# Patient Record
Sex: MALE | Race: WHITE | NOT HISPANIC OR LATINO | Employment: FULL TIME | URBAN - NONMETROPOLITAN AREA
[De-identification: names, ages, dates, MRNs, and addresses within clinical notes are randomized per-mention and may not be internally consistent; named-entity substitution may affect disease eponyms.]

---
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
## 2020-02-27 ENCOUNTER — APPOINTMENT (OUTPATIENT)
Dept: CHIROPRACTIC MEDICINE | Facility: OTHER | Age: 20
End: 2020-02-27

## 2020-02-27 ENCOUNTER — APPOINTMENT (OUTPATIENT)
Dept: OCCUPATIONAL MEDICINE | Facility: OTHER | Age: 20
End: 2020-02-27

## 2020-02-27 PROCEDURE — 99499 UNLISTED E&M SERVICE: CPT

## 2020-03-10 ENCOUNTER — OFFICE VISIT (OUTPATIENT)
Dept: FAMILY MEDICINE | Facility: OTHER | Age: 20
End: 2020-03-10
Attending: NURSE PRACTITIONER
Payer: COMMERCIAL

## 2020-03-10 ENCOUNTER — TELEPHONE (OUTPATIENT)
Dept: FAMILY MEDICINE | Facility: OTHER | Age: 20
End: 2020-03-10

## 2020-03-10 VITALS
SYSTOLIC BLOOD PRESSURE: 134 MMHG | OXYGEN SATURATION: 99 % | HEIGHT: 78 IN | BODY MASS INDEX: 27.19 KG/M2 | WEIGHT: 235 LBS | HEART RATE: 86 BPM | TEMPERATURE: 98.6 F | DIASTOLIC BLOOD PRESSURE: 78 MMHG | RESPIRATION RATE: 18 BRPM

## 2020-03-10 DIAGNOSIS — F34.1 DYSTHYMIA: Primary | ICD-10-CM

## 2020-03-10 PROCEDURE — 99214 OFFICE O/P EST MOD 30 MIN: CPT | Performed by: NURSE PRACTITIONER

## 2020-03-10 PROCEDURE — G0463 HOSPITAL OUTPT CLINIC VISIT: HCPCS

## 2020-03-10 RX ORDER — ESCITALOPRAM OXALATE 10 MG/1
10 TABLET ORAL DAILY
Qty: 90 TABLET | Refills: 0 | Status: SHIPPED | OUTPATIENT
Start: 2020-03-10 | End: 2020-06-04

## 2020-03-10 ASSESSMENT — PATIENT HEALTH QUESTIONNAIRE - PHQ9
5. POOR APPETITE OR OVEREATING: MORE THAN HALF THE DAYS
SUM OF ALL RESPONSES TO PHQ QUESTIONS 1-9: 18

## 2020-03-10 ASSESSMENT — ENCOUNTER SYMPTOMS
SLEEP DISTURBANCE: 1
NERVOUS/ANXIOUS: 1

## 2020-03-10 ASSESSMENT — ANXIETY QUESTIONNAIRES
5. BEING SO RESTLESS THAT IT IS HARD TO SIT STILL: NEARLY EVERY DAY
IF YOU CHECKED OFF ANY PROBLEMS ON THIS QUESTIONNAIRE, HOW DIFFICULT HAVE THESE PROBLEMS MADE IT FOR YOU TO DO YOUR WORK, TAKE CARE OF THINGS AT HOME, OR GET ALONG WITH OTHER PEOPLE: VERY DIFFICULT
2. NOT BEING ABLE TO STOP OR CONTROL WORRYING: MORE THAN HALF THE DAYS
1. FEELING NERVOUS, ANXIOUS, OR ON EDGE: MORE THAN HALF THE DAYS
7. FEELING AFRAID AS IF SOMETHING AWFUL MIGHT HAPPEN: MORE THAN HALF THE DAYS
6. BECOMING EASILY ANNOYED OR IRRITABLE: MORE THAN HALF THE DAYS
GAD7 TOTAL SCORE: 15
3. WORRYING TOO MUCH ABOUT DIFFERENT THINGS: MORE THAN HALF THE DAYS

## 2020-03-10 ASSESSMENT — PAIN SCALES - GENERAL: PAINLEVEL: NO PAIN (0)

## 2020-03-10 ASSESSMENT — MIFFLIN-ST. JEOR: SCORE: 2209.2

## 2020-03-10 NOTE — TELEPHONE ENCOUNTER
Notified patient's father, who has signed consent that we can provider information, that he was started on medication and should have follow up in 4 to 6 weeks.    Janki Daugherty LPN............3/10/2020 4:30 PM

## 2020-03-10 NOTE — NURSING NOTE
Patient presents to clinic for discussion on depression.  Medication Reconciliation: complete    Janki Daugherty LPN

## 2020-03-10 NOTE — TELEPHONE ENCOUNTER
Please call Forest, patient's dad, regarding outcome of the appointment today. Thanks, [phone]

## 2020-03-10 NOTE — PROGRESS NOTES
"  SUBJECTIVE:   Jourdan Berrios is a 19 year old male who presents to clinic today for the following health issues:    HPI  Patient presents for evaluation of depression. Feeling of depression has been on-going for the last year. Discusses not feeling right. Feels down. He has been more isolated and does not leave the home as much as he used to. Reports sleep disturbance, has difficulty falling asleep due to racing thoughts. His girlfriend is with at this visit and confirms that he is not himself. He has not been on medication prior. No personal history of depression. No family history of anxiety/depression.   Works as a  6 days a week.   Denies alcohol/smoking or other drugs.   No thoughts of suicide or self harm.     There are no active problems to display for this patient.    Past Medical History:   Diagnosis Date     Streptococcal sore throat     12/14/07,treated with penicillin      History reviewed. No pertinent surgical history.    Review of Systems   Psychiatric/Behavioral: Positive for mood changes and sleep disturbance. Negative for self-injury and suicidal ideas. The patient is nervous/anxious.         OBJECTIVE:     /78 (BP Location: Right arm, Patient Position: Sitting, Cuff Size: Adult Large)   Pulse 86   Temp 98.6  F (37  C) (Tympanic)   Resp 18   Ht 1.981 m (6' 6\")   Wt 106.6 kg (235 lb)   SpO2 99%   BMI 27.16 kg/m    Body mass index is 27.16 kg/m .  Physical Exam  Psychiatric:         Attention and Perception: Attention normal.         Speech: Speech normal.         Behavior: Behavior is withdrawn.     Patient makes eye contact, but is limited with 1-2 word responses.     Diagnostic Test Results:  none     ASSESSMENT/PLAN:   1. Dysthymia  Discussed at length depression diagnosis and treatment options. Patient does not want to complete counseling, but is open to medication. We will trial Lexapro 10 mg daily. We discussed common side effects as well as black box warning. He " will stop taking medication if he develops suicidal thoughts. He was given information on crisis response team. He should have follow-up in 4-6 weeks. Discussed ok to increase dose of Lexapro to 20 mg after 2 weeks.    - escitalopram (LEXAPRO) 10 MG tablet; Take 1 tablet (10 mg) by mouth daily  Dispense: 90 tablet; Refill: 0    I spent approximately 25 minutes with the patient (exclusive of separately billed services/procedures), with greater than 50% spent in counseling, prognosis, risks and benefits of management or follow-up.  Reviewed importance of compliance with chosen treatment options and follow-up.  Risk factor reduction and patient education and coordinating care, establishing and/or reviewing the patient's medical record also completed during today's exam .        Comfort Cid [facility] AND [facility]

## 2020-03-11 ASSESSMENT — ANXIETY QUESTIONNAIRES: GAD7 TOTAL SCORE: 15

## 2020-06-04 DIAGNOSIS — F34.1 DYSTHYMIA: ICD-10-CM

## 2020-06-04 RX ORDER — ESCITALOPRAM OXALATE 10 MG/1
10 TABLET ORAL DAILY
Qty: 60 TABLET | Refills: 0 | Status: SHIPPED | OUTPATIENT
Start: 2020-06-04

## 2020-06-04 NOTE — TELEPHONE ENCOUNTER
Called back and the father answered and reported that he was the one that called in the patient's medication as he only has 4 left and the patient is a  and has a hard time getting here as the clinic is closed when he gets off work. Father reported that the patient will call to make an appointment when he gets a chance as he wants to stay on this medication. Helena Chirinos RN  ....................  6/4/2020   12:22 PM

## 2020-07-10 ENCOUNTER — TELEPHONE (OUTPATIENT)
Dept: FAMILY MEDICINE | Facility: OTHER | Age: 20
End: 2020-07-10

## 2020-07-10 NOTE — TELEPHONE ENCOUNTER
Patient is working and wont have service until after clinic closes but rescheduled for Monday 13th at 4:20pm for a telephone visit for med refill for his depression meds. He is wanting to know if he can get enough of a refll on it to make it till his appointment, if so please send to [facility].    Please call dad at [phone]    Comfort Campos on 7/10/2020 at 1:37 PM

## 2020-07-10 NOTE — TELEPHONE ENCOUNTER
"\"Patient is working and wont have service until after clinic closes but rescheduled for Monday 13th at 4:20pm for a telephone visit for med refill for his depression meds. He is wanting to know if he can get enough of a refll on it to make it till his appointment, if so please send to [facility].    Please call leelee at [phone]\"     Comfort VToni Campos on 7/10/2020 at 1:37 PM      Noted Medication was ordered 6/4/2020 for 60 day supply to [facility]    Contacted [facility] and they stated that they have 30 tabs remaining as insurance only allows 30 days at a time.  Will fill the remaining 30 days.    Contacted Forest (Pt's father) and relayed that medication had refill remaining and would be filled today.  Dad very appreciative.    Kenyd Shields RN  ....................  7/10/2020   2:23 PM          "

## 2021-09-29 NOTE — TELEPHONE ENCOUNTER
" Disp  Refills  Start  End  SRINIVAS    escitalopram (LEXAPRO) 10 MG tablet  90 tablet  0  3/10/2020   --    Sig - Route: Take 1 tablet (10 mg) by mouth daily - Oral        LOV: 3/10/2020 (LIDIA Cid NP)  Future Office visit: No future appointment scheduled at this time.      PHQ-9 scored 18 on 3/10/2020    Per last office visit patient: was told it was ok to increase dose to 20 mg after 2 weeks and to follow up in 4-6 weeks. No follow appointment has been made.     Routing refill request to provider for review/approval because:  Failed protocol:    PHQ-9 score was above 5 in the past 6 months    Needs to follow up with a provider    Requested Prescriptions   Pending Prescriptions Disp Refills     escitalopram (LEXAPRO) 10 MG tablet 90 tablet 0     Sig: Take 1 tablet (10 mg) by mouth daily       SSRIs Protocol Failed - 6/4/2020 10:26 AM        Failed - PHQ-9 score less than 5 in past 6 months     Please review last PHQ-9 score.           Failed - Recent (6 mo) or future (30 days) visit within the authorizing provider's specialty     Patient had office visit in the last 6 months or has a visit in the next 30 days with authorizing provider or within the authorizing provider's specialty.  See \"Patient Info\" tab in inbasket, or \"Choose Columns\" in Meds & Orders section of the refill encounter.            Passed - Medication is active on med list        Passed - Patient is age 18 or older         Unable to complete prescription refill per RN Medication Refill Policy.................... Helena Chirinos RN ....................  6/4/2020   10:33 AM  " done

## 2022-02-21 ENCOUNTER — APPOINTMENT (OUTPATIENT)
Dept: CHIROPRACTIC MEDICINE | Facility: OTHER | Age: 22
End: 2022-02-21
Attending: CHIROPRACTOR

## 2022-02-21 ENCOUNTER — APPOINTMENT (OUTPATIENT)
Dept: OCCUPATIONAL MEDICINE | Facility: OTHER | Age: 22
End: 2022-02-21

## 2022-02-21 PROCEDURE — 99499 UNLISTED E&M SERVICE: CPT

## 2024-02-26 ENCOUNTER — APPOINTMENT (OUTPATIENT)
Dept: OCCUPATIONAL MEDICINE | Facility: OTHER | Age: 24
End: 2024-02-26

## 2024-02-26 ENCOUNTER — APPOINTMENT (OUTPATIENT)
Dept: CHIROPRACTIC MEDICINE | Facility: OTHER | Age: 24
End: 2024-02-26

## 2024-02-26 PROCEDURE — 99499 UNLISTED E&M SERVICE: CPT
